# Patient Record
Sex: FEMALE | URBAN - METROPOLITAN AREA
[De-identification: names, ages, dates, MRNs, and addresses within clinical notes are randomized per-mention and may not be internally consistent; named-entity substitution may affect disease eponyms.]

---

## 2017-11-30 ENCOUNTER — OFFICE VISIT (OUTPATIENT)
Dept: FAMILY MEDICINE CLINIC | Facility: CLINIC | Age: 27
End: 2017-11-30

## 2017-11-30 VITALS
HEIGHT: 67 IN | TEMPERATURE: 98 F | DIASTOLIC BLOOD PRESSURE: 78 MMHG | HEART RATE: 83 BPM | BODY MASS INDEX: 18.49 KG/M2 | OXYGEN SATURATION: 98 % | WEIGHT: 117.81 LBS | SYSTOLIC BLOOD PRESSURE: 110 MMHG

## 2017-11-30 DIAGNOSIS — Z00.00 ENCOUNTER FOR HEALTH MAINTENANCE EXAMINATION IN ADULT: Primary | ICD-10-CM

## 2017-11-30 DIAGNOSIS — Z23 NEED FOR VACCINATION: ICD-10-CM

## 2017-11-30 PROCEDURE — 90715 TDAP VACCINE 7 YRS/> IM: CPT | Performed by: NURSE PRACTITIONER

## 2017-11-30 PROCEDURE — 99395 PREV VISIT EST AGE 18-39: CPT | Performed by: NURSE PRACTITIONER

## 2017-11-30 PROCEDURE — 90471 IMMUNIZATION ADMIN: CPT | Performed by: NURSE PRACTITIONER

## 2017-11-30 NOTE — PROGRESS NOTES
HPI:    Patient ID: Mercedez Long is a 24year old female. HPI patient  Is here for an annual px-states she has a gynecologist that does her Pap tests she does not need a Pap today.   Patient states she has just not had a physical in a while, was ma well-nourished. No distress. HENT:   Head: Normocephalic and atraumatic.    Right Ear: Hearing, tympanic membrane, external ear and ear canal normal.   Left Ear: Hearing, tympanic membrane, external ear and ear canal normal.   Nose: Nose normal.   Mouth/T health maintenance examination in adult  (primary encounter diagnosis)  Need for vaccination      Orders Placed This Encounter      CBC W Differential W Platelet [E]      Comp Metabolic Panel (14) [E]      Lipid Panel [E]      TSH and Free T4 [E]      TdaP

## 2017-11-30 NOTE — PATIENT INSTRUCTIONS
TDaP (tetanus, diptheria, pertussis) vaccine today     Consider flu shot- return if you decide to have it. Follow up for fasting blood work.      Have records from Dr. Kelly Burgos transferred

## 2018-02-07 ENCOUNTER — OFFICE VISIT (OUTPATIENT)
Dept: FAMILY MEDICINE CLINIC | Facility: CLINIC | Age: 28
End: 2018-02-07

## 2018-02-07 VITALS
SYSTOLIC BLOOD PRESSURE: 102 MMHG | HEART RATE: 89 BPM | DIASTOLIC BLOOD PRESSURE: 68 MMHG | BODY MASS INDEX: 18 KG/M2 | TEMPERATURE: 98 F | WEIGHT: 115 LBS | OXYGEN SATURATION: 98 %

## 2018-02-07 DIAGNOSIS — J01.10 SUBACUTE FRONTAL SINUSITIS: Primary | ICD-10-CM

## 2018-02-07 PROCEDURE — 99213 OFFICE O/P EST LOW 20 MIN: CPT | Performed by: NURSE PRACTITIONER

## 2018-02-07 NOTE — PROGRESS NOTES
CHIEF COMPLAINT:   Patient presents with:  Fever: on monday  Sinus Problem: pressure      HPI:   Zarina Luther is a 24year old female who presents to clinic today with complaints of feeling ill  Monday (2/5) had a fever - 101. 2- has had pain to sin erythematous or injected. No exudates. NECK: supple, non-tender  THYROID: Normal size, no nodules  LUNGS: clear to auscultation bilaterally, no wheezes or rhonchi. Breathing is non labored.   CARDIO: RRR without murmur  MUSCULOSKELETAL: Tight musculature p

## 2018-02-07 NOTE — PATIENT INSTRUCTIONS
Rest, increase fluids, salt water gargles ,ramandeep pot (use distilled water) or saline nasal spray,  Advil cold and sinus (behind the counter), Flonase sesimyst 2 sprays each nostril once a day,  Tylenol/Ibuprofen, call if symptoms persist or increase.

## (undated) NOTE — LETTER
Date: 2/7/2018    Patient Name: Daniela Peralta          To Whom it may concern: This letter has been written at the patient's request. The above patient was seen at the [facility] for treatment of a medical condition.     This patient s

## (undated) NOTE — LETTER
01/29/18        [place] [id number]      Dear Young Martinez,    [id number] [facility] records indicate that you have outstanding lab work and or testing that was ordered for you and has not yet been completed:          CBC W Differential W